# Patient Record
Sex: FEMALE | Race: WHITE | Employment: FULL TIME | URBAN - METROPOLITAN AREA
[De-identification: names, ages, dates, MRNs, and addresses within clinical notes are randomized per-mention and may not be internally consistent; named-entity substitution may affect disease eponyms.]

---

## 2024-10-02 ENCOUNTER — APPOINTMENT (OUTPATIENT)
Dept: CT IMAGING | Facility: HOSPITAL | Age: 29
End: 2024-10-02
Attending: EMERGENCY MEDICINE
Payer: COMMERCIAL

## 2024-10-02 ENCOUNTER — HOSPITAL ENCOUNTER (EMERGENCY)
Facility: HOSPITAL | Age: 29
Discharge: HOME | End: 2024-10-02
Attending: EMERGENCY MEDICINE
Payer: COMMERCIAL

## 2024-10-02 VITALS
WEIGHT: 150 LBS | SYSTOLIC BLOOD PRESSURE: 110 MMHG | BODY MASS INDEX: 21 KG/M2 | DIASTOLIC BLOOD PRESSURE: 65 MMHG | HEIGHT: 71 IN | RESPIRATION RATE: 16 BRPM | HEART RATE: 87 BPM | TEMPERATURE: 98 F | OXYGEN SATURATION: 97 %

## 2024-10-02 DIAGNOSIS — N83.209 RUPTURED OVARIAN CYST: Primary | ICD-10-CM

## 2024-10-02 PROCEDURE — 99284 EMERGENCY DEPT VISIT MOD MDM: CPT

## 2024-10-02 PROCEDURE — 74177 CT ABD & PELVIS W/CONTRAST: CPT | Performed by: EMERGENCY MEDICINE

## 2024-10-03 NOTE — ED PROVIDER NOTES
Patient Seen in: [facility] Emergency Department      History     Chief Complaint   Patient presents with    Abdomen/Flank Pain     Stated Complaint: Abdominal Pain    Subjective:   HPI    29-year-old female presents for evaluation for abdominal pain.  Pain began on Sunday, 3 days ago.  Patient reports that the pain is right lower quadrant and periumbilical.  Has been relatively constant but can wax and wane.  Palpation makes it worse.  She also reports that she developed a fever.  She then developed mild upper respiratory infection.  She was at another hospital yesterday but left secondary to the wait.  She denies any vomiting, diarrhea, dysuria, hematuria.  She denies sore throat or cough.    Objective:     History reviewed. No pertinent past medical history.           Past Surgical History:   Procedure Laterality Date    Foot surgery Left                 Social History     Socioeconomic History    Marital status: Single   Tobacco Use    Smoking status: Never    Smokeless tobacco: Never   Vaping Use    Vaping status: Never Used   Substance and Sexual Activity    Alcohol use: Yes     Comment: socially    Drug use: Never     Social Determinants of Health     Food Insecurity: Low Risk  (4/1/2024)    Received from [facility]    Food Insecurity     Have there been times that your food ran out, and you didn't have money to get more?: No     Are there times that you worry that this might happen?: No   Transportation Needs: Low Risk  (4/1/2024)    Received from [facility]    Transportation Needs     Do you have trouble getting transportation to medical appointments?: No     How do you normally get to and from your appointments?: Car Service (taxi, Uber, Lyft)              Physical Exam     ED Triage Vitals [10/02/24 1905]   /67   Pulse 95   Resp 21   Temp 97.6 °F (36.4 °C)   Temp src Temporal   SpO2 97 %   O2 Device None (Room air)       Current Vitals:   No data  recorded      Physical Exam  Vitals and nursing note reviewed.   Constitutional:       Appearance: Normal appearance.   HENT:      Head: Normocephalic and atraumatic.   Cardiovascular:      Rate and Rhythm: Normal rate and regular rhythm.      Pulses: Normal pulses.           Radial pulses are 2+ on the right side and 2+ on the left side.      Heart sounds: Normal heart sounds.   Pulmonary:      Effort: Pulmonary effort is normal.      Breath sounds: Normal breath sounds and air entry.   Abdominal:      General: Bowel sounds are normal.      Palpations: Abdomen is soft.      Tenderness: There is abdominal tenderness in the right lower quadrant and periumbilical area. There is no guarding or rebound.   Musculoskeletal:         General: Normal range of motion.      Cervical back: Normal range of motion.      Right lower leg: No edema.      Left lower leg: No edema.   Skin:     General: Skin is warm and dry.   Neurological:      General: No focal deficit present.      Mental Status: She is alert.             ED Course   Labs Reviewed - No data to display    ED Course as of 10/04/24 1946  ------------------------------------------------------------  Time: 10/02 2222  Value: CT ABDOMEN+PELVIS(CONTRAST ONLY)(CPT=74177)  Comment: Per my independent interpretation, patient's CT Abdomen and Pelvis demonstrates no appendicitis.                MDM                          Medical Decision Making  Differential diagnosis includes but is not limited to appendicitis, diverticulitis, mesenteric adenitis, constipation, etc.    Labs from immediate care were reviewed.  These were unremarkable.  CT imaging obtained and negative for appendicitis.  It is concerning for ruptured ovarian cyst.  Patient denies any concerns for STIs.  She is discharged home with supportive care.  OB/GYN follow-up encouraged.  Return precautions given.    Medical Record Review: I personally reviewed available prior medical records for any recent pertinent  discharge summaries, testing, and procedures, and reviewed those reports.    Complicating factors: The patient  has no past medical history on file. and  has a past surgical history that includes foot surgery (Left). that contribute to the medical complexity of this ED evaluation.     Clinical impression as well as any lab results and radiology findings were discussed with the patient and/or caregiver. I personally reviewed all laboratory results and radiology images myself. Patient is advised to follow up with PCP for reevaluation. I provided discharge instructions and return precautions. Patient and/or caregiver voices understanding and agreement with the treatment plan. All questions were addressed and answered.         Problems Addressed:  Ruptured ovarian cyst: acute illness or injury with systemic symptoms    Amount and/or Complexity of Data Reviewed  External Data Reviewed: labs.     Details: Patient's labs from yesterday at another hospital reviewed, white blood cell count 8.1, hemoglobin 12.2, platelets 199, pregnancy was negative, urinalysis without evidence of infection, BUN 8, creatinine 0.65, sodium potassium and chloride were normal.  Radiology: ordered and independent interpretation performed. Decision-making details documented in ED Course.    Risk  OTC drugs.        Disposition and Plan     Clinical Impression:  1. Ruptured ovarian cyst         Disposition:  Discharge  10/2/2024 10:23 pm    Follow-up:  No follow-up provider specified.        Medications Prescribed:  Discharge Medication List as of 10/2/2024 10:37 PM              Supplementary Documentation:

## 2024-10-03 NOTE — ED INITIAL ASSESSMENT (HPI)
Patient c/o lower abdominal pain since Sunday. Denies urinary issues. Denies any vaginal bleeding/discharge. Denies N/V/D. +Fever Had blood work and Urine done last night at [facility]. Patient left d/t wait time.

## (undated) NOTE — LETTER
Date & Time: 10/2/2024, 10:23 PM  Patient: Marika Hunter  Encounter Provider(s):    Landry Lopez MD       To Whom It May Concern:    Marika Hunter was seen and treated in our department on 10/2/2024. She can return to work Friday.    If you have any questions or concerns, please do not hesitate to call.        _____________________________  Physician/[facility] Signature